# Patient Record
(demographics unavailable — no encounter records)

---

## 2025-04-22 NOTE — HISTORY OF PRESENT ILLNESS
[FreeTextEntry1] : Name KAYCEE PARIS MRN [id number]  1970 Contact Number: [phone] ----------------------------------------------------------------------------------------------------------------------------------------- Date of First Visit: 3/12/24 Referring Provider/PCP: Dr. Ronald Jernigan F:[phone] -----------------------------------------------------------------------------------------------------------------------------------------  CC: elevated PSA  History of Present Illness: KAYCEE PARIS is a 53 year old male who presents for evaluation of elevated PSA. 23: 5.02. No prior values. No prior biopsy. No prior MRI. Patient denies any significant issues with urination but reports occasional frequency over the past few years - drinks a lot of fluids. Nocturia 1x, sometimes 2x. Not overly bothered. Does drink beer at night. Occasional snoring but not regularly. Family History: half brother with prostate cancer- believes underwent prostatectomy. Father also had prostate cancer - believes had surgery as well -  last year from medical comorbidities and ultimately got COVID. No hematuria. No nephrolithiasis. No UTI. No issues withe rections.  PSA: 12/5/23: 5.02  IPSS: 9 QOL 2 SNEHA: 24 PVR (to ensure adequate emptying): 0 ---------------------------------------------------------------------------------------------------------------------------------------- Interval History (2024):  Found out father did not actually have prostate cancer.  PSA 3/12/24: 6.23, 14% free  MRI: 3/19/24: Size: 4.6 x 3.3 x 3.4 [transverse x AP x CC] cm. Volume: 26.9 cc . PSA density: 0.23 ng/mL/mL Central gland: Moderate BPH. LESION: #1 nonencapsulated Location: Right anterolateral TZ at apex. Slice#: Series 5, Image 21. Size (transverse, AP, CC): 6.9 x 7.6 x 8.4 mm. T2-WI: Mildly to moderately hypointense DWI: Moderately hypointense on ADC map and moderately hyperintense on DWI DCE: Enhancement similar to BPH Extra-prostatic extension: None PI-RADS Assessment Category: 3 Neurovascular bundle: No evidence of neurovascular bundle invasion. Seminal vesicles: No seminal vesicle invasion. Lymph nodes: No pelvic adenopathy. Bones: No suspicious lesions identified. ---------------------------------------------------------------------------------------------------------------------------------------- Interval History (2024):  S/p TP biopsy 24. No issues after biopsy. Pathology multifocal GG1 3 cores: RAA, RPA, right apex TZa. Perineural invasion identified. Decipher sent. Found out paternal grandfather had prostate cancer -  of throat cancer - did autopsy and found out had prostate cancer as well in 70s. ---------------------------------------------------------------------------------------------------------------------------------------- Interval History (2024): Consented to telehealth. Decipher 0.29 - low risk ---------------------------------------------------------------------------------------------------------------------------------------- Interval History (2025): Plan was for AS. Patient did not fu for 6 month. Due for MRI and surveillance biopsy. No issues with urination over this time. No issues with erections.   PVR (to ensure adequate emptying): 0 ---------------------------------------------------------------------------------------------------------------------------------------- PMH: none, anal fissure PSH: ACL reconstruction, tonsillectomy Family History: prostate cancer half brother, paternal grandfather had prostate cancer -  of throat cancer - did autopsy and found out had prostate cancer as well in 70s Social: , 1 child, , no cigarettes, regular cigars a few times a week, regular alcohol, no recreational drugs, marijuana in the past Meds: vit B, E, D, turmeric, HMB protein Allergies: NKDA ROS: no fevers, chills, flank pain ----------------------------------------------------------------------------------------------------------------------------------------- Labs: 23: PSA 5.02 UCx: no growth UA: 0 RBC LE neg nitrite neg Cr 1.17  PSA Trend: 23: 5.02 3/12/24: 6.23, 14% free

## 2025-04-22 NOTE — ASSESSMENT
[FreeTextEntry1] : 54 year old with elevated PSA to 5, repeat 6.23. +AA + family history. MRI 27cc PSA 0.23, PIRADS 3 lesion. S/p TP biopsy 5/13/24. No issues after biopsy. Pathology multifocal GG1 3 cores: RAA, RPA, right apex TZa. Perineural invasion identified. Decipher low risk. Elected AS.  Active Surveillance Protocol: - PSA every 6 months - MRI yearly or for cause - LULÚ every 1-2 years - Biopsy - if stable lesion biopsy at month 12, month 48, q 3-5 years or for cause; no visible lesion: biopsy at month 12, then q3-5y if MRI remains stable  Patient did not f/u 6 month f/u - now 1 year - due for PSA, MRI, and confirmatory biopsy. We discussed the role of confirmatory biopsy. We discussed the procedure and associated risks including but not limited to bleeding, infection, ED, and urinary retention. We discussed transrectal versus transperineal biopsy. We discussed the role of fusion biopsy. The patient opted to move forward with transperineal prostate biopsy.   The patient is aware to expect hematuria x 2 weeks and up to 4 weeks of hematospermia. There is a risk of infection albeit much lower than a transrectal approach. Any fever/chills after the biopsy the patient is to contact the office and go to the ER for an immediate evaluation. He has been given paper instructions outlining these items - which includes medications to avoid prior to surgery.  Patient to get new MRI, telemed after MRI to finalize details, and plan for biopsy next month.   Plan: - Preop labs: CBC, BMP, coags, UA, UCx in office - PSA today - will need medical clearance - MRI - telemed after MRI  - biopsy next month
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
## 2025-04-29 NOTE — PHYSICAL EXAM
[No Acute Distress] : no acute distress [Well-Appearing] : well-appearing [Coordination Grossly Intact] : coordination grossly intact [Normal Gait] : normal gait [Normal Outer Ear/Nose] : the outer ears and nose were normal in appearance [Non Tender] : non-tender [Non-distended] : non-distended [Normal Bowel Sounds] : normal bowel sounds [Normal] : no posterior cervical lymphadenopathy and no anterior cervical lymphadenopathy [de-identified] : erythematous throat

## 2025-04-29 NOTE — HEALTH RISK ASSESSMENT
[Current] : Current [< 15 Years] : < 15 Years [de-identified] : cigars 4 days per week for the last 4-5 years

## 2025-04-29 NOTE — RESULTS/DATA
[] : results reviewed [de-identified] : low RBCs, normal Hgb, will recheck CBC at next appt [de-identified] : normal PT/INR. Mildly elevated aptt [de-identified] : NSR

## 2025-04-29 NOTE — REVIEW OF SYSTEMS
[Sore Throat] : sore throat [Cough] : cough [Fever] : no fever [Chills] : no chills [Earache] : no earache [Nasal Discharge] : no nasal discharge [Chest Pain] : no chest pain [Palpitations] : no palpitations [Orthopena] : no orthopnea [Paroxysmal Nocturnal Dyspnea] : no paroxysmal nocturnal dyspnea [Shortness Of Breath] : no shortness of breath [Wheezing] : no wheezing [Dyspnea on Exertion] : not dyspnea on exertion [Abdominal Pain] : no abdominal pain [Nausea] : no nausea [Constipation] : no constipation [Diarrhea] : no diarrhea [Vomiting] : no vomiting [Headache] : no headache [Dizziness] : no dizziness [Anxiety] : no anxiety [Depression] : no depression

## 2025-04-29 NOTE — PHYSICAL EXAM
[No Acute Distress] : no acute distress [Well-Appearing] : well-appearing [Coordination Grossly Intact] : coordination grossly intact [Normal Gait] : normal gait [Normal Outer Ear/Nose] : the outer ears and nose were normal in appearance [Non Tender] : non-tender [Non-distended] : non-distended [Normal Bowel Sounds] : normal bowel sounds [Normal] : no posterior cervical lymphadenopathy and no anterior cervical lymphadenopathy [de-identified] : erythematous throat

## 2025-04-29 NOTE — RESULTS/DATA
[] : results reviewed [de-identified] : low RBCs, normal Hgb, will recheck CBC at next appt [de-identified] : normal PT/INR. Mildly elevated aptt [de-identified] : NSR

## 2025-04-29 NOTE — PLAN
[FreeTextEntry1] : #Cough - Flu/Covid/RSV swab today - Supportive care  #Pre-Op - Proceed with biopsy after cleared by Hematology

## 2025-04-29 NOTE — HEALTH RISK ASSESSMENT
[Current] : Current [< 15 Years] : < 15 Years [de-identified] : cigars 4 days per week for the last 4-5 years

## 2025-04-29 NOTE — PHYSICAL EXAM
[No Acute Distress] : no acute distress [Well-Appearing] : well-appearing [Coordination Grossly Intact] : coordination grossly intact [Normal Gait] : normal gait [Normal Outer Ear/Nose] : the outer ears and nose were normal in appearance [Non Tender] : non-tender [Non-distended] : non-distended [Normal Bowel Sounds] : normal bowel sounds [Normal] : no posterior cervical lymphadenopathy and no anterior cervical lymphadenopathy [de-identified] : erythematous throat

## 2025-04-29 NOTE — HEALTH RISK ASSESSMENT
[Current] : Current [< 15 Years] : < 15 Years [de-identified] : cigars 4 days per week for the last 4-5 years

## 2025-04-29 NOTE — RESULTS/DATA
[] : results reviewed [de-identified] : low RBCs, normal Hgb, will recheck CBC at next appt [de-identified] : normal PT/INR. Mildly elevated aptt [de-identified] : NSR

## 2025-04-29 NOTE — ASSESSMENT
[As per surgery] : as per surgery [Patient Requires Further Testing] : Patient requires further testing [Other: _____] : [unfilled] [FreeTextEntry4] : #Cough Pt complains of URI symptoms, has been subjectively afebrile, reports 2 negative home Covid tests, will swab for flu/covid/RSV today. No respiratory distress, throat appears mildly erythematous on physical exam, lungs CTAB, VSS, we discussed symptomatic treatment.  #Prolonged aPTT In the setting of normal PT and INR, prolonged aPTT suggests a disorder of the intrinsic pathway, such as von willebrand disease, vs in the setting of malignancy. Can consider mixing study and further hematologic workup. Giving mild worsening of aPTT and mild drop in Hgb, patient may have increased bleeding risk, recommend clearance from Heme prior to surgery.  #Pre-Op Evaluation He is not taking any medications, is active, and generally feels well. Bloodwork already ordered by Dr. Dumas, showing low RBC count (not new), normal PT and INR, elevated aPTT of 39.4 sec. He currently has a cough.

## 2025-04-30 NOTE — HISTORY OF PRESENT ILLNESS
[de-identified] : Mr. Olivier Casillas is a 54-year-old male with past medical and surgical hx as mentioned below, is here for pre op clearance for prostate biopsy given elevated PTT. Mr. Casillas mentioned feeling well. He does not have any urinary or constitutional symptoms. Labs from 25 showed elevated PTT--39.4. His PTT was also high on 24-- 38.1. He denied having any history of bleeding, easy bruising and thrombosis. He denied having any more than expected bleeding after prior procedures and tooth extraction. No family history of bleeding or thrombosis.  Today, he was feeling well except mild residual cough from recent RSV infection. Denies any other concerns including headaches, dizziness, shortness of breath, chest pain, N/V/D/C, abdominal pain.  His other labs including INR, PTT, CBC and CMP was reviewed and was unremarkable. His PSA levels are trending up, most recent 7.42.  He is schedule for prostate biopsy on 25.   PMHx- Prostate adenocarcinoma on surveillance now with increasing PSA levels PSHx- Knee replacement in , anal fissure treated with botox 6 years ago, tooth extraction at age 15. FHx- Prostate cancer in brother, father  from covid. No h/o bleeding dx or clots. Social Hx- drinks 2 big cans of beer on most days or 3-4 glasses of wine on some days, smokes cigar.

## 2025-04-30 NOTE — ASSESSMENT
[FreeTextEntry1] : Mr. Olivier Casillas is a 54-year-old male who is here for pre op clearance for prostate biopsy given elevated PTT. He is schedule for prostate biopsy on 5/5/25.   # Elevated PTT # Pre-op clearance - Labs from 4/22/25 showed elevated PTT--39.4. His PTT was also high on 4/9/24-- 38.1.  - He denied having any history of bleeding , easy bruising and thrombosis. He denied having any more than expected bleeding after prior procedures and tooth extraction.  - No family history of bleeding or thrombosis.  - His other labs including INR, PTT, CBC and CMP was reviewed and was unremarkable.  Plan - Will obtain mixing study, VWF Panel, Factor VIII, IX, XI levels. -  Will call him for the results.  Discussed with Dr. Nj. Please see the attending physician attestation below.

## 2025-04-30 NOTE — END OF VISIT
[] : Fellow [FreeTextEntry3] :  I evaluated the patient with the Hematology fellow, Dr Daniela Durán.  Briefly, the patient is a 54 year old man with low risk prostate cancer on active surveillance.  He is scheduled for prostate biopsy on Monday 5/5/25.  Pre-procedure labs showed prolonged PTT = 39.  Testing last year with similar results.  PT normal.  Platelets normal.  Pt denies hx of bleeding, bruising.  Has had bleeding challenges incl prostate biopsy last year, knee surgery, tooth extraction.  No FMH of bleeding disorders.  Doesn't take any medications - just MVI.  History suggests pt is low risk for a bleeding disorder will work up the abnormal PTT with a mixing study and coagulation factor tests (Factor 8, 9, and 11).  Will also r/o von willebrand disease. Chart will be updated and pt notified when results are available regarding clearance for his upcoming procedure.  testing ordered stat.

## 2025-04-30 NOTE — REVIEW OF SYSTEMS
[Cough] : cough [Abdominal Pain] : no abdominal pain [Vomiting] : no vomiting [Constipation] : no constipation [Dysuria] : no dysuria [Negative] : Psychiatric

## 2025-05-02 NOTE — LETTER BODY
[Dear  ___] : Dear  [unfilled], [Courtesy Letter:] : I had the pleasure of seeing your patient, [unfilled], in my office today. [Please see my note below.] : Please see my note below. [Consult Closing:] : Thank you very much for allowing me to participate in the care of this patient.  If you have any questions, please do not hesitate to contact me. [Sincerely,] : Sincerely, [FreeTextEntry3] : Constance Dumas MD Director of Robotic Education [facility]   [email or web address] [phone]

## 2025-05-02 NOTE — HISTORY OF PRESENT ILLNESS
[FreeTextEntry1] : Name KAYCEE PARIS MRN [id number]  1970 Contact Number: [phone] ----------------------------------------------------------------------------------------------------------------------------------------- Date of First Visit: 3/12/24 Referring Provider/PCP: Dr. Hale -----------------------------------------------------------------------------------------------------------------------------------------  CC: elevated PSA  History of Present Illness: KAYCEE PARIS is a 53 year old male who presents for evaluation of elevated PSA. 23: 5.02. No prior values. No prior biopsy. No prior MRI. Patient denies any significant issues with urination but reports occasional frequency over the past few years - drinks a lot of fluids. Nocturia 1x, sometimes 2x. Not overly bothered. Does drink beer at night. Occasional snoring but not regularly. Family History: half brother with prostate cancer- believes underwent prostatectomy. Father also had prostate cancer - believes had surgery as well -  last year from medical comorbidities and ultimately got COVID. No hematuria. No nephrolithiasis. No UTI. No issues withe rections.  PSA: 23: 5.02  IPSS: 9 QOL 2 SNEHA: 24 PVR (to ensure adequate emptying): 0 ---------------------------------------------------------------------------------------------------------------------------------------- Interval History (2024):  Found out father did not actually have prostate cancer.  PSA 3/12/24: 6.23, 14% free  MRI: 3/19/24: Size: 4.6 x 3.3 x 3.4 [transverse x AP x CC] cm. Volume: 26.9 cc . PSA density: 0.23 ng/mL/mL Central gland: Moderate BPH. LESION: #1 nonencapsulated Location: Right anterolateral TZ at apex. Slice#: Series 5, Image 21. Size (transverse, AP, CC): 6.9 x 7.6 x 8.4 mm. T2-WI: Mildly to moderately hypointense DWI: Moderately hypointense on ADC map and moderately hyperintense on DWI DCE: Enhancement similar to BPH Extra-prostatic extension: None PI-RADS Assessment Category: 3 Neurovascular bundle: No evidence of neurovascular bundle invasion. Seminal vesicles: No seminal vesicle invasion. Lymph nodes: No pelvic adenopathy. Bones: No suspicious lesions identified. ---------------------------------------------------------------------------------------------------------------------------------------- Interval History (2024):  S/p TP biopsy 24. No issues after biopsy. Pathology multifocal GG1 3 cores: RAA, RPA, right apex TZa. Perineural invasion identified. Decipher sent. Found out paternal grandfather had prostate cancer -  of throat cancer - did autopsy and found out had prostate cancer as well in 70s. ---------------------------------------------------------------------------------------------------------------------------------------- Interval History (2024): Consented to telehealth. Decipher 0.29 - low risk ---------------------------------------------------------------------------------------------------------------------------------------- Interval History (2025): Plan was for AS. Patient did not fu for 6 month. Due for MRI and surveillance biopsy. No issues with urination over this time. No issues with erections.   PVR (to ensure adequate emptying): 0 ---------------------------------------------------------------------------------------------------------------------------------------- Interval History (2025): This visit was provided via telehealth using real-time 2-way audio visual technology. The patient, KAYCEE PARIS, was located at home, [address] [place] [address] at the time of the visit. The provider, Dr. Dumas, was located at [address] [place] [address] at the time of the visit. The patient, KAYCEE PARIS and Provider participated in the telehealth encounter. Verbal consent was given by the patient.  PSA: 7.42, 9% free  MRI: Size: 4.8 x 3.1 x 4.0 [transverse x AP x CC] cm. Volume: 31 cc , previously 27 cc . PSA density: 0.24 ng/mL/mL  Central gland: Lesions as described below. Peripheral zone: No focal finding. Multiple linear/wedge-shaped areas of T2 hypointense signal without associated restricted diffusion, likely inflammatory.  MRI Targets:  LESION: #1 Location: Right, entire transverse plane (TZa-p), [id number], transition zone Slice#: Series 5, Image 19. Size (transverse, AP, CC): 8 x 15 x 17 mm. T2-WI: 3 - Heterogeneous signal intensity with obscured margins (including others that do not qualify as 2, 4, or 5). DWI: 5 - Focal markedly hypointense on ADC and markedly hyperintense on high b-value DWI; greater than 1.5 cm in greatest dimension. DCE: Negative - diffuse multifocal enhancement NOT corresponding to a focal finding on T2 and/or DWI. Extra-prostatic extension: None. Prior Comparison: Unchanged when remeasured using similar technique PI-RADS Assessment Category: 4, High  LESION: #2 Location: Right, posterior (TZp), [id number], transition zone Comment: Only well seen on diffusion-weighted sequences, difficult to distinguish from background BPH. Slice#: Series 7, Image 21. Size (transverse, AP): 11 x 9 mm. T2-WI: 3 - Heterogeneous signal intensity with obscured margins (including others that do not qualify as 2, 4, or 5). DWI: 4 - Focal markedly hypointense on ADC and markedly hyperintense on high b-value DWI; less than 1.5cm in greatest dimension. DCE: Negative - diffuse multifocal enhancement NOT corresponding to a focal finding on T2 and/or DWI. Extra-prostatic extension: None. Prior Comparison: In retrospect unchanged. PI-RADS Assessment Category: 4, High  Neurovascular bundle: No evidence of neurovascular bundle invasion. Seminal vesicles: No seminal vesicle invasion. Lymph nodes: No pelvic adenopathy. Bones: No suspicious lesions identified. Urinary bladder: Underdistended limiting evaluation. Other: None.  IMPRESSION: Prostate lesions as detailed above. PIRADS 4 - High (clinically significant cancer is likely to be present) PRECISE 3 - Stable MRI appearance: no new focal/diffuse lesions  Elevated PTT - saw hematology and wnl. ---------------------------------------------------------------------------------------------------------------------------------------- PMH: none, anal fissure PSH: ACL reconstruction, tonsillectomy Family History: prostate cancer half brother, paternal grandfather had prostate cancer -  of throat cancer - did autopsy and found out had prostate cancer as well in 70s Social: , 1 child, , no cigarettes, regular cigars a few times a week, regular alcohol, no recreational drugs, marijuana in the past Meds: vit B, E, D, turmeric, HMB protein Allergies: NKDA ROS: no fevers, chills, flank pain ----------------------------------------------------------------------------------------------------------------------------------------- Labs: 23: PSA 5.02 UCx: no growth UA: 0 RBC LE neg nitrite neg Cr 1.17  PSA Trend: 23: 5.02 3/12/24: 6.23, 14% free

## 2025-05-02 NOTE — ASSESSMENT
[FreeTextEntry1] : 54 year old with elevated PSA to 5, repeat 6.23. +AA + family history. MRI 27cc PSA 0.23, PIRADS 3 lesion. S/p TP biopsy 5/13/24. No issues after biopsy. Pathology multifocal GG1 3 cores: RAA, RPA, right apex TZa. Perineural invasion identified. Decipher low risk. Elected AS. 1 year PSA 7.42. MRI Volume: 31 cc , previously 27 cc . PSA density: 0.24 ng/mL/mL. Lesion 1 Location: Right, entire transverse plane (TZa-p), rfwguzfy-wp-bcly, transition zone. Lesion 2 Location: Right, posterior (TZp), [id number]-ik-sbob, transition zone PIRADS 4 lesions, PRECISE 3.  Active Surveillance Protocol: - PSA every 6 months - MRI yearly or for cause - LULÚ every 1-2 years - Biopsy - if stable lesion biopsy at month 12, month 48, q 3-5 years or for cause; no visible lesion: biopsy at month 12, then q3-5y if MRI remains stable  We discussed the role of confirmatory biopsy. We discussed the procedure and associated risks including but not limited to bleeding, infection, ED, and urinary retention. We discussed transrectal versus transperineal biopsy. We discussed the role of fusion biopsy. The patient opted to move forward with transperineal prostate biopsy.   The patient is aware to expect hematuria x 2 weeks and up to 4 weeks of hematospermia. There is a risk of infection albeit much lower than a transrectal approach. Any fever/chills after the biopsy the patient is to contact the office and go to the ER for an immediate evaluation. He has been given paper instructions outlining these items - which includes medications to avoid prior to surgery.   Plan: - prostate biopsy 5/5

## 2025-05-09 NOTE — LETTER BODY
[Dear  ___] : Dear  [unfilled], [Courtesy Letter:] : I had the pleasure of seeing your patient, [unfilled], in my office today. [Please see my note below.] : Please see my note below. [Consult Closing:] : Thank you very much for allowing me to participate in the care of this patient.  If you have any questions, please do not hesitate to contact me. [Sincerely,] : Sincerely, [FreeTextEntry3] : Constance Dumas MD Director of Robotic Education [facility]   [email or web address] [phone]

## 2025-05-09 NOTE — ASSESSMENT
[FreeTextEntry1] : 54 year old with elevated PSA to 5, repeat 6.23. +AA + family history. MRI 27cc PSA 0.23, PIRADS 3 lesion. S/p TP biopsy 5/13/24. No issues after biopsy. Pathology multifocal GG1 3 cores: RAA, RPA, right apex TZa. Perineural invasion identified. Decipher low risk. Elected AS. 1 year PSA 7.42. MRI Volume: 31 cc , previously 27 cc . PSA density: 0.24 ng/mL/mL. Lesion 1 Location: Right, entire transverse plane (TZa-p), arvnspvn-pq-qnyv, transition zone. Lesion 2 Location: Right, posterior (TZp), [id number]-gd-kkbr, transition zone PIRADS 4 lesions, PRECISE 3. S/p TP biopsy 5/5/25. GG2 right mid TZap 5/6 cores, Destiny 4 <10%, R mid-ap TZp GG2 2/5 cores, Glesaon 4 <10%. GG1 RPA.  Today we discussed the natural history of localized prostate cancer, and the heterogeneous biology of this malignancy.  We discussed the fact that many prostate cancers are slow growing and unlikely to metastasize or cause death, whereas others can be life threatening.  We reviewed risk stratification, staging, Destiny scoring, and PSA levels as they pertain to risk.   All treatment options were reviewed.  This included active surveillance, androgen deprivation, emerging options such as focal therapy/HIFU/cryotherapy, radiation options (including IMRT, SBRT, brachytherapy) and surgical options (open vs. robotic surgery, nerve vs. non-nerve sparing).  Oncologic outcomes were compared and contrasted.  Risks of biochemical and clinical recurrence discussed.  Risks of needing adjuvant or salvage treatments reviewed.  We discussed the risks of secondary malignancy after radiation.  We discussed the opportunity for pathological staging with surgery vs. other options.  We discussed the possibility of positive margins, treatment failure, cancer recurrence and cancer-related death even with treatment.  All potential side effects of treatment were reviewed including, but not limited to: short term or permanent stress urinary incontinence and/or short term or permanent erectile dysfunction, penile shortening, rectal symptoms/pain, perineal pain, and other side effects.  All potential complications of treatment and surgery were reviewed including, but not limited to: risks of conversion from MIS to open surgery discussed, bleeding//life-threatening hemorrhage, rectal injury requiring colostomy or delayed recognition leading to fistula, vascular/bowel/adjacent visceral organ injury, trocar/access injury, the possibility of recognized vs. unrecognized/delayed-recognition injury, risks of thermal/blunt/sharp/retraction injury, risks of DVT, PE, MI, death, risks of cardiopulmonary/anesthesia related complications, positional injury, infection/collection/abscess, wound complications/dehiscence/seroma/cellulitis, urinoma/fistula, ureteral injury/obstruction, bladder neck contracture, penile shortening, meatal stenosis, urethral stricture, lymphocele, obturator nerve injury, prolonged anastomotic leak, and other complications.  Plan: - patient interested in pursuing treatment but would like to consider options - leaning towards surgery - Decipher pending - will see rad onc and then fu with me after above

## 2025-05-09 NOTE — HISTORY OF PRESENT ILLNESS
[FreeTextEntry1] : Name KAYCEE PARIS MRN [id number]  1970 Contact Number: [phone] ----------------------------------------------------------------------------------------------------------------------------------------- Date of First Visit: 3/12/24 Referring Provider/PCP: Dr. Hale -----------------------------------------------------------------------------------------------------------------------------------------  CC: elevated PSA  History of Present Illness: KAYCEE PARIS is a 53 year old male who presents for evaluation of elevated PSA. 23: 5.02. No prior values. No prior biopsy. No prior MRI. Patient denies any significant issues with urination but reports occasional frequency over the past few years - drinks a lot of fluids. Nocturia 1x, sometimes 2x. Not overly bothered. Does drink beer at night. Occasional snoring but not regularly. Family History: half brother with prostate cancer- believes underwent prostatectomy. Father also had prostate cancer - believes had surgery as well -  last year from medical comorbidities and ultimately got COVID. No hematuria. No nephrolithiasis. No UTI. No issues withe rections.  PSA: 23: 5.02  IPSS: 9 QOL 2 SNEHA: 24 PVR (to ensure adequate emptying): 0 ---------------------------------------------------------------------------------------------------------------------------------------- Interval History (2024):  Found out father did not actually have prostate cancer.  PSA 3/12/24: 6.23, 14% free  MRI: 3/19/24: Size: 4.6 x 3.3 x 3.4 [transverse x AP x CC] cm. Volume: 26.9 cc . PSA density: 0.23 ng/mL/mL Central gland: Moderate BPH. LESION: #1 nonencapsulated Location: Right anterolateral TZ at apex. Slice#: Series 5, Image 21. Size (transverse, AP, CC): 6.9 x 7.6 x 8.4 mm. T2-WI: Mildly to moderately hypointense DWI: Moderately hypointense on ADC map and moderately hyperintense on DWI DCE: Enhancement similar to BPH Extra-prostatic extension: None PI-RADS Assessment Category: 3 Neurovascular bundle: No evidence of neurovascular bundle invasion. Seminal vesicles: No seminal vesicle invasion. Lymph nodes: No pelvic adenopathy. Bones: No suspicious lesions identified. ---------------------------------------------------------------------------------------------------------------------------------------- Interval History (2024):  S/p TP biopsy 24. No issues after biopsy. Pathology multifocal GG1 3 cores: RAA, RPA, right apex TZa. Perineural invasion identified. Decipher sent. Found out paternal grandfather had prostate cancer -  of throat cancer - did autopsy and found out had prostate cancer as well in 70s. ---------------------------------------------------------------------------------------------------------------------------------------- Interval History (2024): Consented to telehealth. Decipher 0.29 - low risk ---------------------------------------------------------------------------------------------------------------------------------------- Interval History (2025): Plan was for AS. Patient did not fu for 6 month. Due for MRI and surveillance biopsy. No issues with urination over this time. No issues with erections.   PVR (to ensure adequate emptying): 0 ---------------------------------------------------------------------------------------------------------------------------------------- Interval History (2025): This visit was provided via telehealth using real-time 2-way audio visual technology. The patient, KAYCEE PARIS, was located at home, [address] [place] [address] at the time of the visit. The provider, Dr. Dumas, was located at [address] [place] [address] at the time of the visit. The patient, KAYCEE PARIS and Provider participated in the telehealth encounter. Verbal consent was given by the patient.  PSA: 7.42, 9% free  MRI: Size: 4.8 x 3.1 x 4.0 [transverse x AP x CC] cm. Volume: 31 cc , previously 27 cc . PSA density: 0.24 ng/mL/mL  Central gland: Lesions as described below. Peripheral zone: No focal finding. Multiple linear/wedge-shaped areas of T2 hypointense signal without associated restricted diffusion, likely inflammatory.  MRI Targets:  LESION: #1 Location: Right, entire transverse plane (TZa-p), [id number]-[id number], transition zone Slice#: Series 5, Image 19. Size (transverse, AP, CC): 8 x 15 x 17 mm. T2-WI: 3 - Heterogeneous signal intensity with obscured margins (including others that do not qualify as 2, 4, or 5). DWI: 5 - Focal markedly hypointense on ADC and markedly hyperintense on high b-value DWI; greater than 1.5 cm in greatest dimension. DCE: Negative - diffuse multifocal enhancement NOT corresponding to a focal finding on T2 and/or DWI. Extra-prostatic extension: None. Prior Comparison: Unchanged when remeasured using similar technique PI-RADS Assessment Category: 4, High  LESION: #2 Location: Right, posterior (TZp), rjijpjnd-my-hwei, transition zone Comment: Only well seen on diffusion-weighted sequences, difficult to distinguish from background BPH. Slice#: Series 7, Image 21. Size (transverse, AP): 11 x 9 mm. T2-WI: 3 - Heterogeneous signal intensity with obscured margins (including others that do not qualify as 2, 4, or 5). DWI: 4 - Focal markedly hypointense on ADC and markedly hyperintense on high b-value DWI; less than 1.5cm in greatest dimension. DCE: Negative - diffuse multifocal enhancement NOT corresponding to a focal finding on T2 and/or DWI. Extra-prostatic extension: None. Prior Comparison: In retrospect unchanged. PI-RADS Assessment Category: 4, High  Neurovascular bundle: No evidence of neurovascular bundle invasion. Seminal vesicles: No seminal vesicle invasion. Lymph nodes: No pelvic adenopathy. Bones: No suspicious lesions identified. Urinary bladder: Underdistended limiting evaluation. Other: None.  IMPRESSION: Prostate lesions as detailed above. PIRADS 4 - High (clinically significant cancer is likely to be present) PRECISE 3 - Stable MRI appearance: no new focal/diffuse lesions  Elevated PTT - saw hematology and wnl. ---------------------------------------------------------------------------------------------------------------------------------------- Interval History (2025): S/p TP biopsy 25. No issues post-op. Patholohy:  - Right posterior apex: Grade Group 1 involving 20% (2 mm in length) of 1 core. - Left lateral, needle biopsy: small focus of atypical glands. - Right mid TZAP: Grade Group 2 (Destiny score 3+4=7) involving 100%, 90%, 90%, 30% and 20% (10 mm, 11 mm, 9 mm, 4 mm and 2 mm in length) of 5/6 cores. Zahl pattern 4 comprises less than 10% of tumor. - Ri ght mid-ap TZP: Grade Group 2 (Zahl score 3+4=7) involving 20% and 50% (2 mm and 4 mm in length) of 2/5 cores. Zahl pattern 4 comprises less than 10% of tumor.  Note: No cribriform Zahl pattern 4 identified in this case.    ---------------------------------------------------------------------------------------------------------------------------------------- ---------------------------------------------------------------------------------------------------------------------------------------- PMH: none, anal fissure PSH: ACL reconstruction, tonsillectomy Family History: prostate cancer half brother, paternal grandfather had prostate cancer -  of throat cancer - did autopsy and found out had prostate cancer as well in 70s Social: , 1 child, , no cigarettes, regular cigars a few times a week, regular alcohol, no recreational drugs, marijuana in the past Meds: vit B, E, D, turmeric, HMB protein Allergies: NKDA ROS: no fevers, chills, flank pain ----------------------------------------------------------------------------------------------------------------------------------------- Labs: 23: PSA 5.02 UCx: no growth UA: 0 RBC LE neg nitrite neg Cr 1.17  PSA Trend: 23: 5.02 3/12/24: 6.23, 14% free

## 2025-06-26 NOTE — LETTER BODY
[Dear  ___] : Dear  [unfilled], [Courtesy Letter:] : I had the pleasure of seeing your patient, [unfilled], in my office today. [Please see my note below.] : Please see my note below. [Consult Closing:] : Thank you very much for allowing me to participate in the care of this patient.  If you have any questions, please do not hesitate to contact me. [Sincerely,] : Sincerely, [FreeTextEntry3] : Constance Dumas MD Director of Robotic Education [facility] at [facility]   [email or web address] [phone]

## 2025-06-26 NOTE — ASSESSMENT
[FreeTextEntry1] : 54 year old with elevated PSA to 5, repeat 6.23. +AA + family history. MRI 27cc PSA 0.23, PIRADS 3 lesion. S/p TP biopsy 5/13/24. No issues after biopsy. Pathology multifocal GG1 3 cores: RAA, RPA, right apex TZa. Perineural invasion identified. Decipher low risk. Elected AS. 1 year PSA 7.42. MRI Volume: 31 cc , previously 27 cc . PSA density: 0.24 ng/mL/mL. Lesion 1 Location: Right, entire transverse plane (TZa-p), dhyueuzn-rc-zsxu, transition zone. Lesion 2 Location: Right, posterior (TZp), jxsbgjfc-zn-ivst, transition zone PIRADS 4 lesions, PRECISE 3. S/p TP biopsy 5/5/25. GG2 right mid TZap 5/6 cores, Destiny 4 <10%, R mid-ap TZp GG2 2/5 cores, Glesaon 4 <10%. GG1 RPA. Decipher intermediate risk.  Today we discussed the natural history of localized prostate cancer, and the heterogeneous biology of this malignancy.  We discussed the fact that many prostate cancers are slow growing and unlikely to metastasize or cause death, whereas others can be life threatening.  We reviewed risk stratification, staging, Destiny scoring, and PSA levels as they pertain to risk.   All treatment options were reviewed.  This included active surveillance, androgen deprivation, emerging options such as focal therapy/HIFU/cryotherapy, radiation options (including IMRT, SBRT, brachytherapy) and surgical options (open vs. robotic surgery, nerve vs. non-nerve sparing).  Oncologic outcomes were compared and contrasted.  Risks of biochemical and clinical recurrence discussed.  Risks of needing adjuvant or salvage treatments reviewed.  We discussed the risks of secondary malignancy after radiation.  We discussed the opportunity for pathological staging with surgery vs. other options.  We discussed the possibility of positive margins, treatment failure, cancer recurrence and cancer-related death even with treatment.  All potential side effects of treatment were reviewed including, but not limited to: short term or permanent stress urinary incontinence and/or short term or permanent erectile dysfunction, penile shortening, rectal symptoms/pain, perineal pain, and other side effects.  All potential complications of treatment and surgery were reviewed including, but not limited to: risks of conversion from MIS to open surgery discussed, bleeding//life-threatening hemorrhage, rectal injury requiring colostomy or delayed recognition leading to fistula, vascular/bowel/adjacent visceral organ injury, trocar/access injury, the possibility of recognized vs. unrecognized/delayed-recognition injury, risks of thermal/blunt/sharp/retraction injury, risks of DVT, PE, MI, death, risks of cardiopulmonary/anesthesia related complications, positional injury, infection/collection/abscess, wound complications/dehiscence/seroma/cellulitis, urinoma/fistula, ureteral injury/obstruction, bladder neck contracture, penile shortening, meatal stenosis, urethral stricture, lymphocele, obturator nerve injury, prolonged anastomotic leak, and other complications.  I discussed given upgrading, while small component GG2 he has technically progressed on AS and decipher is intermediate risk, recommend treatment but surveillance not completely unreasonable. Patient does not feel ready to proceed at this time will treatment and would like to revisit in 3 months.   Plan: - fu 3 months, PSA prior  I am the primary provider managing this condition and will be seeing the patient longitudinally.

## 2025-06-26 NOTE — LETTER BODY
[Dear  ___] : Dear  [unfilled], [Courtesy Letter:] : I had the pleasure of seeing your patient, [unfilled], in my office today. [Please see my note below.] : Please see my note below. [Consult Closing:] : Thank you very much for allowing me to participate in the care of this patient.  If you have any questions, please do not hesitate to contact me. [Sincerely,] : Sincerely, [FreeTextEntry3] : Constance Dumas MD Director of Robotic Education [facility] at [facility]   [email or web address] [phone]

## 2025-06-26 NOTE — ASSESSMENT
[FreeTextEntry1] : 54 year old with elevated PSA to 5, repeat 6.23. +AA + family history. MRI 27cc PSA 0.23, PIRADS 3 lesion. S/p TP biopsy 5/13/24. No issues after biopsy. Pathology multifocal GG1 3 cores: RAA, RPA, right apex TZa. Perineural invasion identified. Decipher low risk. Elected AS. 1 year PSA 7.42. MRI Volume: 31 cc , previously 27 cc . PSA density: 0.24 ng/mL/mL. Lesion 1 Location: Right, entire transverse plane (TZa-p), lcbamxbq-ex-ieup, transition zone. Lesion 2 Location: Right, posterior (TZp), zjqzqcqc-uq-tzzc, transition zone PIRADS 4 lesions, PRECISE 3. S/p TP biopsy 5/5/25. GG2 right mid TZap 5/6 cores, Destiny 4 <10%, R mid-ap TZp GG2 2/5 cores, Glesaon 4 <10%. GG1 RPA. Decipher intermediate risk.  Today we discussed the natural history of localized prostate cancer, and the heterogeneous biology of this malignancy.  We discussed the fact that many prostate cancers are slow growing and unlikely to metastasize or cause death, whereas others can be life threatening.  We reviewed risk stratification, staging, Destiny scoring, and PSA levels as they pertain to risk.   All treatment options were reviewed.  This included active surveillance, androgen deprivation, emerging options such as focal therapy/HIFU/cryotherapy, radiation options (including IMRT, SBRT, brachytherapy) and surgical options (open vs. robotic surgery, nerve vs. non-nerve sparing).  Oncologic outcomes were compared and contrasted.  Risks of biochemical and clinical recurrence discussed.  Risks of needing adjuvant or salvage treatments reviewed.  We discussed the risks of secondary malignancy after radiation.  We discussed the opportunity for pathological staging with surgery vs. other options.  We discussed the possibility of positive margins, treatment failure, cancer recurrence and cancer-related death even with treatment.  All potential side effects of treatment were reviewed including, but not limited to: short term or permanent stress urinary incontinence and/or short term or permanent erectile dysfunction, penile shortening, rectal symptoms/pain, perineal pain, and other side effects.  All potential complications of treatment and surgery were reviewed including, but not limited to: risks of conversion from MIS to open surgery discussed, bleeding//life-threatening hemorrhage, rectal injury requiring colostomy or delayed recognition leading to fistula, vascular/bowel/adjacent visceral organ injury, trocar/access injury, the possibility of recognized vs. unrecognized/delayed-recognition injury, risks of thermal/blunt/sharp/retraction injury, risks of DVT, PE, MI, death, risks of cardiopulmonary/anesthesia related complications, positional injury, infection/collection/abscess, wound complications/dehiscence/seroma/cellulitis, urinoma/fistula, ureteral injury/obstruction, bladder neck contracture, penile shortening, meatal stenosis, urethral stricture, lymphocele, obturator nerve injury, prolonged anastomotic leak, and other complications.  I discussed given upgrading, while small component GG2 he has technically progressed on AS and decipher is intermediate risk, recommend treatment but surveillance not completely unreasonable. Patient does not feel ready to proceed at this time will treatment and would like to revisit in 3 months.   Plan: - fu 3 months, PSA prior  I am the primary provider managing this condition and will be seeing the patient longitudinally.

## 2025-06-26 NOTE — HISTORY OF PRESENT ILLNESS
[FreeTextEntry1] : Name KAYCEE PARIS MRN [id number]  1970 Contact Number: [phone] ----------------------------------------------------------------------------------------------------------------------------------------- Date of First Visit: 3/12/24 Referring Provider/PCP: Dr. Hale -----------------------------------------------------------------------------------------------------------------------------------------  CC: elevated PSA  History of Present Illness: KAYCEE PARIS is a 53 year old male who presents for evaluation of elevated PSA. 23: 5.02. No prior values. No prior biopsy. No prior MRI. Patient denies any significant issues with urination but reports occasional frequency over the past few years - drinks a lot of fluids. Nocturia 1x, sometimes 2x. Not overly bothered. Does drink beer at night. Occasional snoring but not regularly. Family History: half brother with prostate cancer- believes underwent prostatectomy. Father also had prostate cancer - believes had surgery as well -  last year from medical comorbidities and ultimately got COVID. No hematuria. No nephrolithiasis. No UTI. No issues withe rections.  PSA: 23: 5.02  IPSS: 9 QOL 2 SNEHA: 24 PVR (to ensure adequate emptying): 0 ---------------------------------------------------------------------------------------------------------------------------------------- Interval History (2024):  Found out father did not actually have prostate cancer.  PSA 3/12/24: 6.23, 14% free  MRI: 3/19/24: Size: 4.6 x 3.3 x 3.4 [transverse x AP x CC] cm. Volume: 26.9 cc . PSA density: 0.23 ng/mL/mL Central gland: Moderate BPH. LESION: #1 nonencapsulated Location: Right anterolateral TZ at apex. Slice#: Series 5, Image 21. Size (transverse, AP, CC): 6.9 x 7.6 x 8.4 mm. T2-WI: Mildly to moderately hypointense DWI: Moderately hypointense on ADC map and moderately hyperintense on DWI DCE: Enhancement similar to BPH Extra-prostatic extension: None PI-RADS Assessment Category: 3 Neurovascular bundle: No evidence of neurovascular bundle invasion. Seminal vesicles: No seminal vesicle invasion. Lymph nodes: No pelvic adenopathy. Bones: No suspicious lesions identified. ---------------------------------------------------------------------------------------------------------------------------------------- Interval History (2024):  S/p TP biopsy 24. No issues after biopsy. Pathology multifocal GG1 3 cores: RAA, RPA, right apex TZa. Perineural invasion identified. Decipher sent. Found out paternal grandfather had prostate cancer -  of throat cancer - did autopsy and found out had prostate cancer as well in 70s. ---------------------------------------------------------------------------------------------------------------------------------------- Interval History (2024): Consented to telehealth. Decipher 0.29 - low risk ---------------------------------------------------------------------------------------------------------------------------------------- Interval History (2025): Plan was for AS. Patient did not fu for 6 month. Due for MRI and surveillance biopsy. No issues with urination over this time. No issues with erections.   PVR (to ensure adequate emptying): 0 ---------------------------------------------------------------------------------------------------------------------------------------- Interval History (2025): This visit was provided via telehealth using real-time 2-way audio visual technology. The patient, KAYCEE PARIS, was located at home, [address] [place] [address] at the time of the visit. The provider, Dr. Dumas, was located at [address] [place] [address] at the time of the visit. The patient, KAYCEE PARIS and Provider participated in the telehealth encounter. Verbal consent was given by the patient.  PSA: 7.42, 9% free  MRI: Size: 4.8 x 3.1 x 4.0 [transverse x AP x CC] cm. Volume: 31 cc , previously 27 cc . PSA density: 0.24 ng/mL/mL  Central gland: Lesions as described below. Peripheral zone: No focal finding. Multiple linear/wedge-shaped areas of T2 hypointense signal without associated restricted diffusion, likely inflammatory.  MRI Targets:  LESION: #1 Location: Right, entire transverse plane (TZa-p), [id number], transition zone Slice#: Series 5, Image 19. Size (transverse, AP, CC): 8 x 15 x 17 mm. T2-WI: 3 - Heterogeneous signal intensity with obscured margins (including others that do not qualify as 2, 4, or 5). DWI: 5 - Focal markedly hypointense on ADC and markedly hyperintense on high b-value DWI; greater than 1.5 cm in greatest dimension. DCE: Negative - diffuse multifocal enhancement NOT corresponding to a focal finding on T2 and/or DWI. Extra-prostatic extension: None. Prior Comparison: Unchanged when remeasured using similar technique PI-RADS Assessment Category: 4, High  LESION: #2 Location: Right, posterior (TZp), [id number], transition zone Comment: Only well seen on diffusion-weighted sequences, difficult to distinguish from background BPH. Slice#: Series 7, Image 21. Size (transverse, AP): 11 x 9 mm. T2-WI: 3 - Heterogeneous signal intensity with obscured margins (including others that do not qualify as 2, 4, or 5). DWI: 4 - Focal markedly hypointense on ADC and markedly hyperintense on high b-value DWI; less than 1.5cm in greatest dimension. DCE: Negative - diffuse multifocal enhancement NOT corresponding to a focal finding on T2 and/or DWI. Extra-prostatic extension: None. Prior Comparison: In retrospect unchanged. PI-RADS Assessment Category: 4, High  Neurovascular bundle: No evidence of neurovascular bundle invasion. Seminal vesicles: No seminal vesicle invasion. Lymph nodes: No pelvic adenopathy. Bones: No suspicious lesions identified. Urinary bladder: Underdistended limiting evaluation. Other: None.  IMPRESSION: Prostate lesions as detailed above. PIRADS 4 - High (clinically significant cancer is likely to be present) PRECISE 3 - Stable MRI appearance: no new focal/diffuse lesions  Elevated PTT - saw hematology and wnl. ---------------------------------------------------------------------------------------------------------------------------------------- Interval History (2025): S/p TP biopsy 25. No issues post-op. Pathology:  - Right posterior apex: Grade Group 1 involving 20% (2 mm in length) of 1 core. - Left lateral, needle biopsy: small focus of atypical glands. - Right mid TZAP: Grade Group 2 (Destiny score 3+4=7) involving 100%, 90%, 90%, 30% and 20% (10 mm, 11 mm, 9 mm, 4 mm and 2 mm in length) of 5/6 cores. [place] pattern 4 comprises less than 10% of tumor. - Right mid-ap TZP: Grade Group 2 (Destiny score 3+4=7) involving 20% and 50% (2 mm and 4 mm in length) of 2/5 cores. [place] pattern 4 comprises less than 10% of tumor.  Note: No cribriform Destiny pattern 4 identified in this case. ---------------------------------------------------------------------------------------------------------------------------------------- Interval History (2025): This visit was provided via telehealth using real-time 2-way audio visual technology. The patient, KAYCEE PARIS, was located at home, [address] [place] [address] , at the time of the visit. The provider, Dr. Dumas, was located at [address] [place] [address] at the time of the visit. The patient, KAYCEE PARIS and Provider participated in the telehealth encounter. Verbal consent was given by the patient.  Decipher: intermediate risk. Saw rad onc at [facility] not ready to proceed with any modality. ---------------------------------------------------------------------------------------------------------------------------------------- PMH: none, anal fissure PSH: ACL reconstruction, tonsillectomy Family History: prostate cancer half brother, paternal grandfather had prostate cancer -  of throat cancer - did autopsy and found out had prostate cancer as well in 70s Social: , 1 child, , no cigarettes, regular cigars a few times a week, regular alcohol, no recreational drugs, marijuana in the past Meds: vit B, E, D, turmeric, HMB protein Allergies: NKDA ROS: no fevers, chills, flank pain ----------------------------------------------------------------------------------------------------------------------------------------- Labs: 23: PSA 5.02 UCx: no growth UA: 0 RBC LE neg nitrite neg Cr 1.17  PSA Trend: 23: 5.02 3/12/24: 6.23, 14% free

## 2025-06-26 NOTE — HISTORY OF PRESENT ILLNESS
[FreeTextEntry1] : Name KAYCEE PARIS MRN [id number]  1970 Contact Number: [phone] ----------------------------------------------------------------------------------------------------------------------------------------- Date of First Visit: 3/12/24 Referring Provider/PCP: Dr. Hale -----------------------------------------------------------------------------------------------------------------------------------------  CC: elevated PSA  History of Present Illness: KAYCEE PARIS is a 53 year old male who presents for evaluation of elevated PSA. 23: 5.02. No prior values. No prior biopsy. No prior MRI. Patient denies any significant issues with urination but reports occasional frequency over the past few years - drinks a lot of fluids. Nocturia 1x, sometimes 2x. Not overly bothered. Does drink beer at night. Occasional snoring but not regularly. Family History: half brother with prostate cancer- believes underwent prostatectomy. Father also had prostate cancer - believes had surgery as well -  last year from medical comorbidities and ultimately got COVID. No hematuria. No nephrolithiasis. No UTI. No issues withe rections.  PSA: 23: 5.02  IPSS: 9 QOL 2 SNEHA: 24 PVR (to ensure adequate emptying): 0 ---------------------------------------------------------------------------------------------------------------------------------------- Interval History (2024):  Found out father did not actually have prostate cancer.  PSA 3/12/24: 6.23, 14% free  MRI: 3/19/24: Size: 4.6 x 3.3 x 3.4 [transverse x AP x CC] cm. Volume: 26.9 cc . PSA density: 0.23 ng/mL/mL Central gland: Moderate BPH. LESION: #1 nonencapsulated Location: Right anterolateral TZ at apex. Slice#: Series 5, Image 21. Size (transverse, AP, CC): 6.9 x 7.6 x 8.4 mm. T2-WI: Mildly to moderately hypointense DWI: Moderately hypointense on ADC map and moderately hyperintense on DWI DCE: Enhancement similar to BPH Extra-prostatic extension: None PI-RADS Assessment Category: 3 Neurovascular bundle: No evidence of neurovascular bundle invasion. Seminal vesicles: No seminal vesicle invasion. Lymph nodes: No pelvic adenopathy. Bones: No suspicious lesions identified. ---------------------------------------------------------------------------------------------------------------------------------------- Interval History (2024):  S/p TP biopsy 24. No issues after biopsy. Pathology multifocal GG1 3 cores: RAA, RPA, right apex TZa. Perineural invasion identified. Decipher sent. Found out paternal grandfather had prostate cancer -  of throat cancer - did autopsy and found out had prostate cancer as well in 70s. ---------------------------------------------------------------------------------------------------------------------------------------- Interval History (2024): Consented to telehealth. Decipher 0.29 - low risk ---------------------------------------------------------------------------------------------------------------------------------------- Interval History (2025): Plan was for AS. Patient did not fu for 6 month. Due for MRI and surveillance biopsy. No issues with urination over this time. No issues with erections.   PVR (to ensure adequate emptying): 0 ---------------------------------------------------------------------------------------------------------------------------------------- Interval History (2025): This visit was provided via telehealth using real-time 2-way audio visual technology. The patient, KAYCEE PARIS, was located at home, [address] [place] [address] at the time of the visit. The provider, Dr. Dumas, was located at [address] [place] [address] at the time of the visit. The patient, KAYCEE PARIS and Provider participated in the telehealth encounter. Verbal consent was given by the patient.  PSA: 7.42, 9% free  MRI: Size: 4.8 x 3.1 x 4.0 [transverse x AP x CC] cm. Volume: 31 cc , previously 27 cc . PSA density: 0.24 ng/mL/mL  Central gland: Lesions as described below. Peripheral zone: No focal finding. Multiple linear/wedge-shaped areas of T2 hypointense signal without associated restricted diffusion, likely inflammatory.  MRI Targets:  LESION: #1 Location: Right, entire transverse plane (TZa-p), [id number], transition zone Slice#: Series 5, Image 19. Size (transverse, AP, CC): 8 x 15 x 17 mm. T2-WI: 3 - Heterogeneous signal intensity with obscured margins (including others that do not qualify as 2, 4, or 5). DWI: 5 - Focal markedly hypointense on ADC and markedly hyperintense on high b-value DWI; greater than 1.5 cm in greatest dimension. DCE: Negative - diffuse multifocal enhancement NOT corresponding to a focal finding on T2 and/or DWI. Extra-prostatic extension: None. Prior Comparison: Unchanged when remeasured using similar technique PI-RADS Assessment Category: 4, High  LESION: #2 Location: Right, posterior (TZp), [id number], transition zone Comment: Only well seen on diffusion-weighted sequences, difficult to distinguish from background BPH. Slice#: Series 7, Image 21. Size (transverse, AP): 11 x 9 mm. T2-WI: 3 - Heterogeneous signal intensity with obscured margins (including others that do not qualify as 2, 4, or 5). DWI: 4 - Focal markedly hypointense on ADC and markedly hyperintense on high b-value DWI; less than 1.5cm in greatest dimension. DCE: Negative - diffuse multifocal enhancement NOT corresponding to a focal finding on T2 and/or DWI. Extra-prostatic extension: None. Prior Comparison: In retrospect unchanged. PI-RADS Assessment Category: 4, High  Neurovascular bundle: No evidence of neurovascular bundle invasion. Seminal vesicles: No seminal vesicle invasion. Lymph nodes: No pelvic adenopathy. Bones: No suspicious lesions identified. Urinary bladder: Underdistended limiting evaluation. Other: None.  IMPRESSION: Prostate lesions as detailed above. PIRADS 4 - High (clinically significant cancer is likely to be present) PRECISE 3 - Stable MRI appearance: no new focal/diffuse lesions  Elevated PTT - saw hematology and wnl. ---------------------------------------------------------------------------------------------------------------------------------------- Interval History (2025): S/p TP biopsy 25. No issues post-op. Pathology:  - Right posterior apex: Grade Group 1 involving 20% (2 mm in length) of 1 core. - Left lateral, needle biopsy: small focus of atypical glands. - Right mid TZAP: Grade Group 2 (Destiny score 3+4=7) involving 100%, 90%, 90%, 30% and 20% (10 mm, 11 mm, 9 mm, 4 mm and 2 mm in length) of 5/6 cores. [place] pattern 4 comprises less than 10% of tumor. - Right mid-ap TZP: Grade Group 2 (Destiny score 3+4=7) involving 20% and 50% (2 mm and 4 mm in length) of 2/5 cores. [place] pattern 4 comprises less than 10% of tumor.  Note: No cribriform Destiny pattern 4 identified in this case. ---------------------------------------------------------------------------------------------------------------------------------------- Interval History (2025): This visit was provided via telehealth using real-time 2-way audio visual technology. The patient, KAYCEE PARIS, was located at home, [address] [place] [address] , at the time of the visit. The provider, Dr. Dumas, was located at [address] [place] [address] at the time of the visit. The patient, KAYCEE PARIS and Provider participated in the telehealth encounter. Verbal consent was given by the patient.  Decipher: intermediate risk. Saw rad onc at [facility] not ready to proceed with any modality. ---------------------------------------------------------------------------------------------------------------------------------------- PMH: none, anal fissure PSH: ACL reconstruction, tonsillectomy Family History: prostate cancer half brother, paternal grandfather had prostate cancer -  of throat cancer - did autopsy and found out had prostate cancer as well in 70s Social: , 1 child, , no cigarettes, regular cigars a few times a week, regular alcohol, no recreational drugs, marijuana in the past Meds: vit B, E, D, turmeric, HMB protein Allergies: NKDA ROS: no fevers, chills, flank pain ----------------------------------------------------------------------------------------------------------------------------------------- Labs: 23: PSA 5.02 UCx: no growth UA: 0 RBC LE neg nitrite neg Cr 1.17  PSA Trend: 23: 5.02 3/12/24: 6.23, 14% free